# Patient Record
Sex: MALE
[De-identification: names, ages, dates, MRNs, and addresses within clinical notes are randomized per-mention and may not be internally consistent; named-entity substitution may affect disease eponyms.]

---

## 2019-06-27 ENCOUNTER — TRANSCRIPTION ENCOUNTER (OUTPATIENT)
Age: 44
End: 2019-06-27

## 2019-09-23 ENCOUNTER — TRANSCRIPTION ENCOUNTER (OUTPATIENT)
Age: 44
End: 2019-09-23

## 2019-10-01 ENCOUNTER — APPOINTMENT (OUTPATIENT)
Dept: NEUROSURGERY | Facility: CLINIC | Age: 44
End: 2019-10-01
Payer: COMMERCIAL

## 2019-10-01 VITALS
WEIGHT: 205 LBS | SYSTOLIC BLOOD PRESSURE: 143 MMHG | DIASTOLIC BLOOD PRESSURE: 96 MMHG | HEART RATE: 109 BPM | BODY MASS INDEX: 28.7 KG/M2 | HEIGHT: 71 IN

## 2019-10-01 DIAGNOSIS — Z82.49 FAMILY HISTORY OF ISCHEMIC HEART DISEASE AND OTHER DISEASES OF THE CIRCULATORY SYSTEM: ICD-10-CM

## 2019-10-01 DIAGNOSIS — Z78.9 OTHER SPECIFIED HEALTH STATUS: ICD-10-CM

## 2019-10-01 DIAGNOSIS — Z86.39 PERSONAL HISTORY OF OTHER ENDOCRINE, NUTRITIONAL AND METABOLIC DISEASE: ICD-10-CM

## 2019-10-01 DIAGNOSIS — M54.9 DORSALGIA, UNSPECIFIED: ICD-10-CM

## 2019-10-01 DIAGNOSIS — Z80.3 FAMILY HISTORY OF MALIGNANT NEOPLASM OF BREAST: ICD-10-CM

## 2019-10-01 PROBLEM — Z00.00 ENCOUNTER FOR PREVENTIVE HEALTH EXAMINATION: Status: ACTIVE | Noted: 2019-10-01

## 2019-10-01 PROCEDURE — 99204 OFFICE O/P NEW MOD 45 MIN: CPT

## 2019-10-01 RX ORDER — CHOLECALCIFEROL (VITAMIN D3) 125 MCG
TABLET ORAL
Refills: 0 | Status: ACTIVE | COMMUNITY

## 2019-10-01 RX ORDER — ROSUVASTATIN CALCIUM 10 MG/1
10 TABLET, FILM COATED ORAL
Refills: 0 | Status: ACTIVE | COMMUNITY

## 2019-10-01 NOTE — HISTORY OF PRESENT ILLNESS
[FreeTextEntry1] : Neck pain  [de-identified] : Mr. Rooney is a pleasant 43 year old male who presents to the office today with complaints of intermittent neck pain for several years. He will experience right sided neck pain intermittently throughout the year which is exacerbated with certain twisting movements. He had only seen his PMD in the past for this, but never a specialist. Most recently he developed severe right sided neck pain with radiation into his right shoulder about two weeks ago. He presented to the [facility] where he was given an injection of Decadron, prednisone and cyclobenzaprine and discharged home. He did get some relief from the injection, and currently rates his pain as 9/10. His pain is exacerbated with flexion and extension of his neck. He has associated numbness and tingling to the right second, third and fourth fingers. He had been taking OTC antiinflammatories with little to no relief. He is right hand dominant and denies loss of  strength and dexterity.

## 2019-10-01 NOTE — RESULTS/DATA
[FreeTextEntry1] : I've reviewed patients previous MRI (WOOD 2014) cervical spine with them in the office today. At C4-5 there is a posterior broad based disc bulge resulting in mild central canal and left foraminal stenosis. At C5-6 there is a right paracentral disc herniation impinging on the ventral thecal sac and  resulting in moderate cord compression and right foraminal stenosis. At C6-7 there is a right paracentral disc herniation resulting in right foraminal stenosis and impinging on the C7 nerve root.

## 2019-10-01 NOTE — PLAN
[FreeTextEntry1] : This is a pleasant 43 year old male who presents to the office today with signs and symptoms suggestive of a cervical radiculopathy. He is neurologically intact, but has pain among the C6 and C7 nerve dermatome. His previous MRI reveals right paracentral disc herniations at C5-6 and C6-7. I've recommended obtaining a new MRI as he has severe pain. Once the MR has been performed we will have him back in the office to review the images and discuss treatment options. I've prescribed him PT to start twice weekly and recommended he start an antiinflammatory twice daily. He is agreeable with this plan.

## 2019-10-01 NOTE — REVIEW OF SYSTEMS
[As Noted in HPI] : as noted in HPI [Numbness] : numbness [Negative] : Constitutional [Tingling] : tingling

## 2019-10-01 NOTE — CONSULT LETTER
[Dear  ___] : Dear  [unfilled], [Consult Letter:] : I had the pleasure of evaluating your patient, [unfilled]. [Consult Closing:] : Thank you very much for allowing me to participate in the care of this patient.  If you have any questions, please do not hesitate to contact me. [Sincerely,] : Sincerely, [FreeTextEntry1] : Mr. Rooney is a pleasant 43 year old male who presents to the office today with complaints of intermittent neck pain for several years. He will experience right sided neck pain intermittently throughout the year which is exacerbated with certain twisting movements. He had only seen his PMD in the past for this, but never a specialist. Most recently he developed severe right sided neck pain with radiation into his right shoulder about two weeks ago. He presented to the [facility] where he was given an injection of Decadron, prednisone and cyclobenzaprine and discharged home. He did get some relief from the injection, and currently rates his pain as 9/10. His pain is exacerbated with flexion and extension of his neck. He has associated numbness and tingling to the right second, third and fourth fingers. He had been taking OTC antiinflammatories with little to no relief. He is right hand dominant and denies loss of  strength and dexterity. \par \par This is a pleasant 43 year old male who presents to the office today with signs and symptoms suggestive of a cervical radiculopathy. He is neurologically intact, but has pain among the C6 and C7 nerve dermatome. His previous MRI reveals right paracentral disc herniations at C5-6 and C6-7. I've recommended obtaining a new MRI as he has severe pain. Once the MR has been performed we will have him back in the office to review the images and discuss treatment options. I've prescribed him PT to start twice weekly and recommended he start an antiinflammatory twice daily. He is agreeable with this plan.  [FreeTextEntry3] : Elizabeth Merino RLuisPAC

## 2019-10-01 NOTE — REASON FOR VISIT
[New Patient Visit] : a new patient visit [Referred By: _________] : Patient was referred by [facility] [FreeTextEntry1] : NECK AND LOWER BACK PAIN- ON AND OFF FOR ABOUT 20 YEARS. PAIN LEVEL TODAY IS ABOUT A 1. HIS NECK PAIN  OCCURS WHEN TILTING OR TURNING HIS HEAD.

## 2019-10-01 NOTE — PHYSICAL EXAM
[General Appearance - Alert] : alert [General Appearance - Well Nourished] : well nourished [General Appearance - In No Acute Distress] : in no acute distress [] : normal voice and communication [General Appearance - Well-Appearing] : healthy appearing [General Appearance - Well Developed] : well developed [Person] : oriented to person [Place] : oriented to place [Time] : oriented to time [Cranial Nerves Optic (II)] : visual acuity intact bilaterally,  pupils equal round and reactive to light [Cranial Nerves Trigeminal (V)] : facial sensation intact symmetrically [Cranial Nerves Oculomotor (III)] : extraocular motion intact [Cranial Nerves Vestibulocochlear (VIII)] : hearing was intact bilaterally [Cranial Nerves Facial (VII)] : face symmetrical [Cranial Nerves Accessory (XI - Cranial And Spinal)] : head turning and shoulder shrug symmetric [Cranial Nerves Glossopharyngeal (IX)] : tongue and palate midline [Motor Tone] : muscle tone was normal in all four extremities [Motor Strength] : muscle strength was normal in all four extremities [Cranial Nerves Hypoglossal (XII)] : there was no tongue deviation with protrusion [Involuntary Movements] : no involuntary movements were seen [No Muscle Atrophy] : normal bulk in all four extremities [Motor Handedness Right-Handed] : the patient is right hand dominant [Sensation Vibration Decrease] : vibration was intact [Sensation Tactile Decrease] : light touch was intact [Abnormal Walk] : normal gait [Proprioception] : proprioception was intact [Balance] : balance was intact [2+] : Brachioradialis right 2+ [No Visual Abnormalities] : no visible abnormalities [Normal Lordosis] : normal lordosis [No Tenderness to Palpation] : no spine tenderness on palpation [No Scoliosis] : no scoliosis [No Masses] : no masses [Full ROM] : full ROM [Normal] : normal [Spurling's Same Side] : Positive Spurling's on same side [Intact] : all sensory within normal limits bilaterally

## 2019-10-29 PROBLEM — M54.9 ACUTE BACK PAIN: Status: ACTIVE | Noted: 2019-10-29

## 2019-11-18 ENCOUNTER — APPOINTMENT (OUTPATIENT)
Dept: NEUROSURGERY | Facility: CLINIC | Age: 44
End: 2019-11-18
Payer: COMMERCIAL

## 2019-11-18 VITALS
HEART RATE: 80 BPM | DIASTOLIC BLOOD PRESSURE: 87 MMHG | BODY MASS INDEX: 28.7 KG/M2 | SYSTOLIC BLOOD PRESSURE: 132 MMHG | HEIGHT: 71 IN | WEIGHT: 205 LBS

## 2019-11-18 DIAGNOSIS — M50.20 OTHER CERVICAL DISC DISPLACEMENT, UNSPECIFIED CERVICAL REGION: ICD-10-CM

## 2019-11-18 DIAGNOSIS — M51.9 UNSPECIFIED THORACIC, THORACOLUMBAR AND LUMBOSACRAL INTERVERTEBRAL DISC DISORDER: ICD-10-CM

## 2019-11-18 DIAGNOSIS — M54.12 RADICULOPATHY, CERVICAL REGION: ICD-10-CM

## 2019-11-18 PROCEDURE — 99214 OFFICE O/P EST MOD 30 MIN: CPT

## 2019-11-18 NOTE — REASON FOR VISIT
[Follow-Up: _____] : a [unfilled] follow-up visit [Spouse] : spouse [FreeTextEntry1] : \par Uriel is here for followup evaluation regarding his lumbar spine. The cervical spine study done for his initial visit. Really has no cervical complaints currently. He has some axial back pain and spasm that since resolved.\par \par PMD  Mor Curry\par \par

## 2019-11-18 NOTE — RESULTS/DATA
[FreeTextEntry1] : [facility] MRI of the lumbar spine shows mild degenerative changes without evidence of herniation or stenosis.

## 2019-11-18 NOTE — ASSESSMENT
[FreeTextEntry1] : DIAGNOSIS:  DISK DEGENERATION   TREATMENT PLAN:  NON-SURGICAL  This is a patient with a degenerated lumbar disk.  I have recommended nonsurgical management at this time.  This consists of physical therapy and/or manual medicine in conjunction to medical therapy and other conservative methods.  These include the consideration of trigger point injections and or the utilization of modalities such as TENS where applicable.  The next tier would be the referral to a pain management specialist (anesthesia or physiatry) for the consideration of spinal injections.  This includes the options of epidural steroids, facet injections as well as other novel techniques that may provide pain relief.  Although there is indeed evidence of disk degeneration on imaging, discectomy or spinal fusion for the sole purposes of treating back/leg pain in the absence of a compressive lesion or neurological issue is associated with poor outcomes.     I have reviewed the images in detail with the patient today in my office and have answered all questions regarding this condition to the best of my ability to the patient’s satisfaction.